# Patient Record
Sex: FEMALE | Race: WHITE
[De-identification: names, ages, dates, MRNs, and addresses within clinical notes are randomized per-mention and may not be internally consistent; named-entity substitution may affect disease eponyms.]

---

## 2017-10-23 ENCOUNTER — HOSPITAL ENCOUNTER (OUTPATIENT)
Dept: HOSPITAL 84 - D.US | Age: 62
Discharge: HOME | End: 2017-10-23
Attending: THORACIC SURGERY (CARDIOTHORACIC VASCULAR SURGERY)
Payer: COMMERCIAL

## 2017-10-23 VITALS — BODY MASS INDEX: 27.4 KG/M2

## 2017-10-23 DIAGNOSIS — I65.23: Primary | ICD-10-CM

## 2019-01-22 ENCOUNTER — HOSPITAL ENCOUNTER (EMERGENCY)
Dept: HOSPITAL 84 - D.ER | Age: 64
Discharge: HOME | End: 2019-01-22
Payer: COMMERCIAL

## 2019-01-22 VITALS — DIASTOLIC BLOOD PRESSURE: 77 MMHG | SYSTOLIC BLOOD PRESSURE: 130 MMHG

## 2019-01-22 VITALS
WEIGHT: 167.35 LBS | WEIGHT: 167.35 LBS | BODY MASS INDEX: 28.57 KG/M2 | HEIGHT: 64 IN | HEIGHT: 64 IN | BODY MASS INDEX: 28.57 KG/M2
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
## 2019-01-22 DIAGNOSIS — R51: ICD-10-CM

## 2019-01-22 DIAGNOSIS — S16.1XXA: Primary | ICD-10-CM

## 2019-03-04 ENCOUNTER — HOSPITAL ENCOUNTER (OUTPATIENT)
Dept: HOSPITAL 84 - D.US | Age: 64
Discharge: HOME | End: 2019-03-04
Attending: THORACIC SURGERY (CARDIOTHORACIC VASCULAR SURGERY)
Payer: COMMERCIAL

## 2019-03-04 DIAGNOSIS — I65.23: Primary | ICD-10-CM

## 2019-08-22 ENCOUNTER — HOSPITAL ENCOUNTER (EMERGENCY)
Dept: HOSPITAL 84 - D.ER | Age: 64
Discharge: HOME | End: 2019-08-22
Payer: COMMERCIAL

## 2019-08-22 VITALS — SYSTOLIC BLOOD PRESSURE: 153 MMHG | DIASTOLIC BLOOD PRESSURE: 80 MMHG

## 2019-08-22 VITALS — BODY MASS INDEX: 28.74 KG/M2 | WEIGHT: 168.35 LBS | HEIGHT: 64 IN
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
## 2019-08-22 DIAGNOSIS — S13.4XXA: Primary | ICD-10-CM
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
## 2019-08-22 LAB
ALBUMIN SERPL-MCNC: 3.7 G/DL (ref 3.4–5)
ALP SERPL-CCNC: 73 U/L (ref 46–116)
ALT SERPL-CCNC: 18 U/L (ref 10–68)
ANION GAP SERPL CALC-SCNC: 15.7 MMOL/L (ref 8–16)
APTT BLD: 28.4 SECONDS (ref 22.8–39.4)
BASOPHILS NFR BLD AUTO: 1.2 % (ref 0–2)
BILIRUB SERPL-MCNC: 0.29 MG/DL (ref 0.2–1.3)
BUN SERPL-MCNC: 14 MG/DL (ref 7–18)
CALCIUM SERPL-MCNC: 9.3 MG/DL (ref 8.5–10.1)
CHLORIDE SERPL-SCNC: 102 MMOL/L (ref 98–107)
CO2 SERPL-SCNC: 24.1 MMOL/L (ref 21–32)
CREAT SERPL-MCNC: 0.8 MG/DL (ref 0.6–1.3)
EOSINOPHIL NFR BLD: 5.5 % (ref 0–7)
ERYTHROCYTE [DISTWIDTH] IN BLOOD BY AUTOMATED COUNT: 13.1 % (ref 11.5–14.5)
GLOBULIN SER-MCNC: 3.6 G/L
GLUCOSE SERPL-MCNC: 96 MG/DL (ref 74–106)
HCT VFR BLD CALC: 39.6 % (ref 36–48)
HGB BLD-MCNC: 13.2 G/DL (ref 12–16)
IMM GRANULOCYTES NFR BLD: 0.2 % (ref 0–5)
INR PPP: 0.97 (ref 0.85–1.17)
LYMPHOCYTES NFR BLD AUTO: 26.9 % (ref 15–50)
MCH RBC QN AUTO: 31.8 PG (ref 26–34)
MCHC RBC AUTO-ENTMCNC: 33.3 G/DL (ref 31–37)
MCV RBC: 95.4 FL (ref 80–100)
MONOCYTES NFR BLD: 8.8 % (ref 2–11)
NEUTROPHILS NFR BLD AUTO: 57.4 % (ref 40–80)
OSMOLALITY SERPL CALC.SUM OF ELEC: 274 MOSM/KG (ref 275–300)
PLATELET # BLD: 338 10X3/UL (ref 130–400)
PMV BLD AUTO: 10 FL (ref 7.4–10.4)
POTASSIUM SERPL-SCNC: 4.8 MMOL/L (ref 3.5–5.1)
PROT SERPL-MCNC: 7.3 G/DL (ref 6.4–8.2)
PROTHROMBIN TIME: 12.4 SECONDS (ref 11.6–15)
RBC # BLD AUTO: 4.15 10X6/UL (ref 4–5.4)
SODIUM SERPL-SCNC: 137 MMOL/L (ref 136–145)
WBC # BLD AUTO: 5.8 10X3/UL (ref 4.8–10.8)

## 2019-10-16 ENCOUNTER — HOSPITAL ENCOUNTER (OUTPATIENT)
Dept: HOSPITAL 84 - D.HCCECHO | Age: 64
Discharge: HOME | End: 2019-10-16
Attending: INTERNAL MEDICINE
Payer: COMMERCIAL

## 2019-10-16 VITALS — BODY MASS INDEX: 28.9 KG/M2

## 2019-10-16 DIAGNOSIS — R06.09: Primary | ICD-10-CM

## 2019-10-21 NOTE — EC
PATIENT:SAMARA STUART                   DATE OF SERVICE: 10/16/19
SEX: F                                  MEDICAL RECORD: [id number]
DATE OF BIRTH: [birth year]                        LOCATION:D.[facility]          
AGE OF PATIENT: 63                             ADMISSION DATE: 10/16/19
 
REFERRING PHYSICIAN:                               
 
INTERPRETING PHYSICIAN: TOMASA DE JESUS MD          
 
 
 
                             ECHOCARDIOGRAM REPORT
  ECHO CHARGES 4               ECHO COMPLETE                 Date: 10/16/19
 
 
 
CLINICAL DIAGNOSIS: TSE                           
                    H/O CVA/HTN                   
                         ECHOCARDIOGRAPHIC MEASUREMENTS
      (adult normal given)
   AC root (d.<3.7cm) 3.1  cm   LV Septum d (<1.2 cm> 1.2  cm
      Valve Excursion 1.0  cm     LV Septum (systole) 1.5  cm
Left Atria (s.<4.0cm> 3.4  cm          LVPW d(<1.2cm) 1.2  cm
        RV (d.<2.3cm) 2.2  cm           LVPW (sytole) 1.6  cm
  LV diastole(<5.6CM) 4.7  cm       MV E-F(>70mm/sec)      cm
           LV systole 3.3  cm           LVOT Diameter 1.8  cm
       MV exc.(>10mm)      cm
Est.ejection fraction (50-75%)     %
 
   DOPPLER:
     LVIT      cm/sec A 138  cm/sec E 63.0  cm/sec
       LA      cm/sec      RVSP 19.0 mmHg
     LVOT 108  cm/sec   AOP1/2T      m/s
  Asc. Ao 144  cm/sec
     RVOT 57.0 cm/sec
       RA      cm/sec
       PA 77.0 cm/sec
 AV Gradient Peak 8.3  mmHg  AV Mean 4.1  mmHg  AV Area 1.9  cm
 MV Gradient Peak 7.8  mmHg  MV Mean 2.5  mmHg  MV Area      cm
   COMMENTS: OP - HC                                      
 
 
 Cardiac Sonographer: 1               MUSTAPHA REYESOE            
      Cardiologist: 3          Dr. Bledsoe             
             TAPE# PACS           
                                       Pericardial Effusion N                        
 
 
DATE OF SERVICE:  
 
Adequate 2D, color flow, spectral Doppler, and M-mode.  LVH is present.  LV
internal dimensions are normal.  Wall motion is normal.  EF is greater than or
equal to 55%.  Aortic valve is tricuspid.  No evidence of stenosis by Doppler
interrogation.  The left atrium is normal.  Mitral valve shows no prolapse. 
Trace MR.  Right-sided chambers are grossly normal.  Trace TR.
 
TRANSINT:[id number] Voice Confirmation ID: [id number] DOCUMENT ID: [id number]
 
 
 
ECHOCARDIOGRAM REPORT                          [id number]    SMAARA STUART,TOMASA MICHEL MD          
 
 
 
Electronically Signed by TOMASA DE JESUS on 10/21/19 at 1356
 
 
 
 
 
 
 
 
 
 
 
 
 
 
 
 
 
 
 
 
 
 
 
 
 
 
 
 
 
 
 
 
 
 
 
 
 
 
 
 
CC:                                                             [id number]
DICTATION DATE: 10/18/19 1218     :     10/18/19 1235      DEP CLI 
                                                                      10/16/19
[id number] [place] [address]

## 2020-09-25 ENCOUNTER — HOSPITAL ENCOUNTER (OUTPATIENT)
Dept: HOSPITAL 84 - D.US | Age: 65
Discharge: HOME | End: 2020-09-25
Attending: THORACIC SURGERY (CARDIOTHORACIC VASCULAR SURGERY)
Payer: MEDICARE

## 2020-09-25 VITALS — BODY MASS INDEX: 28.9 KG/M2

## 2020-09-25 DIAGNOSIS — I65.21: Primary | ICD-10-CM

## 2020-10-12 ENCOUNTER — HOSPITAL ENCOUNTER (OUTPATIENT)
Dept: HOSPITAL 84 - D.CT | Age: 65
Discharge: HOME | End: 2020-10-12
Attending: THORACIC SURGERY (CARDIOTHORACIC VASCULAR SURGERY)
Payer: MEDICARE

## 2020-10-12 VITALS — BODY MASS INDEX: 28.9 KG/M2

## 2020-10-12 DIAGNOSIS — I65.23: Primary | ICD-10-CM

## 2020-10-19 ENCOUNTER — HOSPITAL ENCOUNTER (OUTPATIENT)
Dept: HOSPITAL 84 - D.HCCECHO | Age: 65
Discharge: HOME | End: 2020-10-19
Attending: INTERNAL MEDICINE
Payer: MEDICARE

## 2020-10-19 VITALS — BODY MASS INDEX: 28.9 KG/M2

## 2020-10-19 DIAGNOSIS — I67.9: Primary | ICD-10-CM

## 2021-05-19 ENCOUNTER — HOSPITAL ENCOUNTER (OUTPATIENT)
Dept: HOSPITAL 84 - D.CT | Age: 66
Discharge: HOME | End: 2021-05-19
Attending: FAMILY MEDICINE
Payer: MEDICARE

## 2021-05-19 VITALS — BODY MASS INDEX: 27.9 KG/M2

## 2021-05-19 DIAGNOSIS — M25.562: Primary | ICD-10-CM

## 2021-05-24 ENCOUNTER — HOSPITAL ENCOUNTER (OUTPATIENT)
Dept: HOSPITAL 84 - D.MRI | Age: 66
Discharge: HOME | End: 2021-05-24
Attending: FAMILY MEDICINE
Payer: MEDICARE

## 2021-05-24 VITALS — BODY MASS INDEX: 27.9 KG/M2

## 2021-05-24 DIAGNOSIS — M25.562: Primary | ICD-10-CM

## 2021-07-01 ENCOUNTER — HOSPITAL ENCOUNTER (OUTPATIENT)
Dept: HOSPITAL 84 - D.LABREF | Age: 66
Discharge: HOME | End: 2021-07-01
Attending: ORTHOPAEDIC SURGERY
Payer: MEDICARE

## 2021-07-01 VITALS — BODY MASS INDEX: 27.9 KG/M2

## 2021-07-01 DIAGNOSIS — M17.12: Primary | ICD-10-CM
